# Patient Record
Sex: FEMALE
[De-identification: names, ages, dates, MRNs, and addresses within clinical notes are randomized per-mention and may not be internally consistent; named-entity substitution may affect disease eponyms.]

---

## 2022-08-23 PROBLEM — Z00.00 ENCOUNTER FOR PREVENTIVE HEALTH EXAMINATION: Status: ACTIVE | Noted: 2022-08-23

## 2022-09-01 ENCOUNTER — RESULT REVIEW (OUTPATIENT)
Age: 57
End: 2022-09-01

## 2022-09-12 ENCOUNTER — LABORATORY RESULT (OUTPATIENT)
Age: 57
End: 2022-09-12

## 2022-09-12 ENCOUNTER — APPOINTMENT (OUTPATIENT)
Dept: RADIOLOGY | Facility: CLINIC | Age: 57
End: 2022-09-12

## 2022-09-12 ENCOUNTER — OUTPATIENT (OUTPATIENT)
Dept: OUTPATIENT SERVICES | Facility: HOSPITAL | Age: 57
LOS: 1 days | End: 2022-09-12

## 2022-09-12 ENCOUNTER — TRANSCRIPTION ENCOUNTER (OUTPATIENT)
Age: 57
End: 2022-09-12

## 2022-09-12 ENCOUNTER — APPOINTMENT (OUTPATIENT)
Dept: ORTHOPEDIC SURGERY | Facility: CLINIC | Age: 57
End: 2022-09-12

## 2022-09-12 ENCOUNTER — APPOINTMENT (OUTPATIENT)
Dept: CT IMAGING | Facility: CLINIC | Age: 57
End: 2022-09-12

## 2022-09-12 ENCOUNTER — NON-APPOINTMENT (OUTPATIENT)
Age: 57
End: 2022-09-12

## 2022-09-12 VITALS
WEIGHT: 170 LBS | SYSTOLIC BLOOD PRESSURE: 111 MMHG | BODY MASS INDEX: 26.68 KG/M2 | RESPIRATION RATE: 16 BRPM | DIASTOLIC BLOOD PRESSURE: 75 MMHG | TEMPERATURE: 98.1 F | HEART RATE: 66 BPM | OXYGEN SATURATION: 96 % | HEIGHT: 67 IN

## 2022-09-12 DIAGNOSIS — Z87.39 PERSONAL HISTORY OF OTHER DISEASES OF THE MUSCULOSKELETAL SYSTEM AND CONNECTIVE TISSUE: ICD-10-CM

## 2022-09-12 DIAGNOSIS — Z01.812 ENCOUNTER FOR PREPROCEDURAL LABORATORY EXAMINATION: ICD-10-CM

## 2022-09-12 DIAGNOSIS — Z78.9 OTHER SPECIFIED HEALTH STATUS: ICD-10-CM

## 2022-09-12 DIAGNOSIS — M25.561 PAIN IN RIGHT KNEE: ICD-10-CM

## 2022-09-12 DIAGNOSIS — Z82.49 FAMILY HISTORY OF ISCHEMIC HEART DISEASE AND OTHER DISEASES OF THE CIRCULATORY SYSTEM: ICD-10-CM

## 2022-09-12 DIAGNOSIS — M17.11 UNILATERAL PRIMARY OSTEOARTHRITIS, RIGHT KNEE: ICD-10-CM

## 2022-09-12 DIAGNOSIS — Z20.822 ENCOUNTER FOR PREPROCEDURAL LABORATORY EXAMINATION: ICD-10-CM

## 2022-09-12 DIAGNOSIS — Z80.8 FAMILY HISTORY OF MALIGNANT NEOPLASM OF OTHER ORGANS OR SYSTEMS: ICD-10-CM

## 2022-09-12 PROCEDURE — 73562 X-RAY EXAM OF KNEE 3: CPT | Mod: 26,50,59

## 2022-09-12 PROCEDURE — 73700 CT LOWER EXTREMITY W/O DYE: CPT | Mod: 26,RT

## 2022-09-12 PROCEDURE — 77073 BONE LENGTH STUDIES: CPT | Mod: 26

## 2022-09-12 PROCEDURE — 99205 OFFICE O/P NEW HI 60 MIN: CPT

## 2022-09-12 NOTE — PHYSICAL EXAM
[de-identified] : Right Knee Exam: \par \par Skin: Normal. No erythema, no ecchymosis, no abrasions, no scratches, no tattoos.  \par                 \par Old Incisions: Healed arthroscopy portals and ACL surgey scars. \par \par Knee Joint Swelling: Positive effusion.  Moderate\par \par Popliteal Swelling: None	\par \par Pre-Patella Bursa Swelling: None. \par \par Alignment: 		        \par Valgus, Mild \par Passively correctable to near neutral.  \par \par Knee Joint Line Tenderness: \par Tender at lateral joint line. \par Not tender at the medial joint line.   \par Not tender in the patellofemoral compartment. \par \par Soft Tissue Tenderness: None. \par \par Medial Collateral Ligament Laxity:  Good endpoint.                                                       \par Normal laxity. Good endpoint. \par \par Lateral Collateral Ligament Laxity: Good endpoint       \par Lateral opening to varus stress.   Moderate\par \par ROM Extension: 0 degrees.   \par ROM Flexion: 135 degrees.   \par \par ACL Testing: 			\par Stable ACL. Good Endpoint.                                                                \par Lachman Test: Negative \par Anterior Drawer Test: Negative \par \par PCL Testing: 			\par Stable PCL.  Good Endpoint.                                                               \par Posterior Drawer Test: Negative \par \par Patella Compression Test: Negative \par \par Patellofemoral Crepitus: None.   \par \par Patellofemoral Apprehension Testing: Negative. \par \par Patellofemoral Laxity: Normal.\par \par Motor Strength: 			\par Quadriceps strength is 5 out of 5                                                                \par Hamstring strength is 5 out of 5                                                               \par Ankle dorsiflexion strength is 5 out of 5                                                              \par Ankle plantarflexion strength is 5 out of 5 \par \par Sensation: Light tough sensation in the lower extremity is abnormal. She has decreased sensation in the tibial tubercle region along with medial leg and lateral leg sensory changes.\par                                    \par Pulses: 				\par Pulses are palpable at the ankle at the Dorsalis Pedis Artery.                                                               \par Pulses are palpable at the Posterior Tibialis Artery.                                                               \par \par Osteophytes: There are no palpable osteophytes along the knee margins.                                                                \par \par Hip Motion: The patient has full and painless hip range of motion.                                                        \par \par Hip Tenderness: The patient has no Greater Trochanteric hip bursa tenderness.                                                                 \par \par Lumbar Spine Symptoms: Negative straight leg raise.  \par \par Leg Lengths: Symmetric.                                                                 \par \par Gait: Limping Gait.  Abnormal gait.  \par \par Assistive Devices: 	None\par  [de-identified] : X-ray of the Right Knee:\par \par AP Standing View:\par Lateral joint space loss. Moderate.\par Medial joint space preserved.\par \par PA Flexion View:\par Lateral joint space loss. Severe.\par Medial joint space preserved.\par \par \par Lateral View: \par Patellofemoral joint space preserved.\par \par [place] View: \par Patellofemoral joint space is preserved.\par Patellofemoral joint central tracking.\par \par \par Bilateral Hip to Ankle Standing View:\par Alignment: Valgus\par Lateral joint space loss.Mild.\par Hips: No significant changes\par \par \par \par

## 2022-09-12 NOTE — REVIEW OF SYSTEMS
[Negative] : Heme/Lymph [Arthralgia] : arthralgia [Joint Pain] : joint pain [Joint Stiffness] : joint stiffness [Joint Swelling] : joint swelling [[id number]] : Arthritis

## 2022-09-12 NOTE — HISTORY OF PRESENT ILLNESS
[de-identified] :  Dania is a  57 year old female  who presents today for a Right knee pain.\par \par Afia complains of pain in her right knee increasing over the last 8 months.  She has pain with activities including skiing.  She has swelling in the right knee joint.  Her location of her pain is on the outer side in the lateral compartment sometimes, just above the kneecap as well, she has some pain in the calf and outer side of the lower leg as well.  Her pain is intermittent.  Her pain level is a 5 out of 10 and she rates it as moderate the character the pain is a dull aching pain that is getting worse and her painful activities include squatting lunging and going down stairs as well as hiking down hills and skiing.  She had a knee arthroscopy in the past and after that knee arthroscopy she had sensory changes around the area of the tibial tubercle was dull down the medial side of the calf is definitely more dull and sometimes hypersensitive and sensitivity on the lateral side of the leg is also decreased.\par \par She is here today for a preoperative evaluation for a right lateral compartment partial knee replacement with Tim robotic arm assistance.  We have already had a previous communication over the phone and going through a lot of her history and her story.\par \par \par

## 2022-09-14 RX ORDER — TRAMADOL HYDROCHLORIDE 50 MG/1
50 TABLET, COATED ORAL
Qty: 32 | Refills: 0 | Status: ACTIVE | COMMUNITY
Start: 2022-09-14 | End: 1900-01-01

## 2022-09-14 RX ORDER — CEPHALEXIN 500 MG/1
500 CAPSULE ORAL 4 TIMES DAILY
Qty: 20 | Refills: 0 | Status: ACTIVE | COMMUNITY
Start: 2022-09-14 | End: 1900-01-01

## 2022-09-14 RX ORDER — CETIRIZINE HCL 10 MG
10 TABLET ORAL
Refills: 0 | Status: ACTIVE | COMMUNITY

## 2022-09-14 RX ORDER — ALPRAZOLAM 2 MG/1
TABLET ORAL
Refills: 0 | Status: ACTIVE | COMMUNITY

## 2022-09-15 ENCOUNTER — APPOINTMENT (OUTPATIENT)
Age: 57
End: 2022-09-15

## 2022-09-15 PROCEDURE — 27446 REVISION OF KNEE JOINT: CPT | Mod: RT

## 2022-09-16 ENCOUNTER — APPOINTMENT (OUTPATIENT)
Dept: ORTHOPEDIC SURGERY | Facility: CLINIC | Age: 57
End: 2022-09-16

## 2022-09-16 VITALS
TEMPERATURE: 98.7 F | DIASTOLIC BLOOD PRESSURE: 75 MMHG | SYSTOLIC BLOOD PRESSURE: 119 MMHG | HEIGHT: 67 IN | HEART RATE: 62 BPM | OXYGEN SATURATION: 96 % | BODY MASS INDEX: 26.68 KG/M2 | RESPIRATION RATE: 16 BRPM | WEIGHT: 170 LBS

## 2022-09-16 PROCEDURE — 73562 X-RAY EXAM OF KNEE 3: CPT | Mod: 26,RT,59

## 2022-09-16 PROCEDURE — 77073 BONE LENGTH STUDIES: CPT | Mod: 26

## 2022-09-16 PROCEDURE — 99024 POSTOP FOLLOW-UP VISIT: CPT

## 2022-10-01 NOTE — DISCUSSION/SUMMARY
Problem: Discharge Planning  Goal: Discharge to home or other facility with appropriate resources  Outcome: Progressing     Problem: Safety - Adult  Goal: Free from fall injury  Outcome: Progressing     Problem: Chronic Conditions and Co-morbidities  Goal: Patient's chronic conditions and co-morbidity symptoms are monitored and maintained or improved  Outcome: Progressing     Problem: Pain  Goal: Verbalizes/displays adequate comfort level or baseline comfort level  Outcome: Progressing     Problem: Respiratory - Adult  Goal: Achieves optimal ventilation and oxygenation  Outcome: Progressing [de-identified] : Assessment:\par \par -Right Knee Lateral compartment OA\par -Right lateral Knee Pain\par -Right Knee xray with severe lateral joint space loss on flexion WB view.\par \par \par Plan: \par \par 1. The patient would like to proceed with a Partial Knee Replacement \par 2. The procedure will be Tim Robotic Arm Assisted with cemented, [employer] MCK implants \par 2. The side, Right \par 4. Compartment, Lateral \par 2. The patient will discuss surgery scheduling with our coordinator\par 3. We will schedule a pre-op office visit just prior to surgery to review all information\par 4. Schedule Tim™ Robotic-Arm System with the OR \par 5. Schedule CT scan with Tim™ Protocol with Radiology\par 6. Schedule Medical Clearance & Pre-op studies\par 7. Schedule PRP in the Operating Room with vendor \par 8. I have had an Informed Consent Discussion with the patient & documentation was provided\par \par \par \par Discussion Partial Knee Replacement:\par \par I had a discussion with the patient regarding the findings of their history, exam and imaging. We discussed the option of Partial Knee Replacement using the precision Tim™ Robotic-Arm System. We discussed the indications, surgical process, my techniques of surgery, the probable post-operative course and instructions, and the risks and benefits of the procedure. The patient had their questions answered to their satisfaction. Although there are no guarantees to success, I feel that the surgery would be very beneficial and there is an excellent chance for a positive outcome.\par \par The risks of this procedure include but are not limited to the risks of anesthesia, heart attack, stroke, respiratory complications, wound healing problems, skin blisters, delayed wound healing, infection, bleeding, nerve damage, vessel damage, skin sensory changes next to the incisions, loss of motion, scar tissue formation requiring further treatment, blood clots, heterotopic bone formation, implant wear, implant loosening after surgery, allergic responses to the implant materials, continued pain despite proper implant placement, soft tissue pain, continued knee swelling, with partial knee replacement there can be progression of arthritis into the other compartments requiring future surgery, and the possible need for further surgery in the future for scar tissue removal, plastic insert change over time, addition of other compartment replacements, and possible revision to total knee replacement.\par \par I look forward to the opportunity to help this patient with their painful knee condition.\par

## 2023-01-17 ENCOUNTER — APPOINTMENT (OUTPATIENT)
Dept: RADIOLOGY | Facility: CLINIC | Age: 58
End: 2023-01-17

## 2023-01-17 ENCOUNTER — APPOINTMENT (OUTPATIENT)
Dept: ORTHOPEDIC SURGERY | Facility: CLINIC | Age: 58
End: 2023-01-17
Payer: COMMERCIAL

## 2023-01-17 ENCOUNTER — OUTPATIENT (OUTPATIENT)
Dept: OUTPATIENT SERVICES | Facility: HOSPITAL | Age: 58
LOS: 1 days | End: 2023-01-17
Payer: COMMERCIAL

## 2023-01-17 ENCOUNTER — RESULT REVIEW (OUTPATIENT)
Age: 58
End: 2023-01-17

## 2023-01-17 ENCOUNTER — NON-APPOINTMENT (OUTPATIENT)
Age: 58
End: 2023-01-17

## 2023-01-17 VITALS
HEIGHT: 68 IN | BODY MASS INDEX: 25.01 KG/M2 | TEMPERATURE: 98.6 F | SYSTOLIC BLOOD PRESSURE: 128 MMHG | WEIGHT: 165 LBS | OXYGEN SATURATION: 99 % | RESPIRATION RATE: 18 BRPM | HEART RATE: 61 BPM | DIASTOLIC BLOOD PRESSURE: 76 MMHG

## 2023-01-17 DIAGNOSIS — Z47.1 AFTERCARE FOLLOWING JOINT REPLACEMENT SURGERY: ICD-10-CM

## 2023-01-17 DIAGNOSIS — Z96.651 AFTERCARE FOLLOWING JOINT REPLACEMENT SURGERY: ICD-10-CM

## 2023-01-17 DIAGNOSIS — Z96.651 PRESENCE OF RIGHT ARTIFICIAL KNEE JOINT: ICD-10-CM

## 2023-01-17 PROCEDURE — 77073 BONE LENGTH STUDIES: CPT | Mod: 26

## 2023-01-17 PROCEDURE — 73562 X-RAY EXAM OF KNEE 3: CPT | Mod: 26,RT

## 2023-01-17 PROCEDURE — 99213 OFFICE O/P EST LOW 20 MIN: CPT

## 2023-01-17 NOTE — PHYSICAL EXAM
[de-identified] : Right Knee Exam: \par \par Skin: Normal. No erythema, no ecchymosis, no abrasions, no scratches, no tattoos.  \par                 \par Old Incisions: Healed arthroscopy portals and ACL surgey scars. Lateral incision is well healed as are the pin sites.\par \par Knee Joint Swelling: none\par \par Popliteal Swelling: None	\par \par Pre-Patella Bursa Swelling: None. \par \par Alignment: Neutral\par \par Knee Joint Line Tenderness: \par Not tender at lateral joint line. \par Not tender at the medial joint line.   \par Not tender in the patellofemoral compartment. \par \par Soft Tissue Tenderness: None. \par \par Medial Collateral Ligament Laxity:  Good endpoint.                                                       \par Normal laxity. Good endpoint. \par \par Lateral Collateral Ligament Laxity: Good endpoint       \par Lateral opening to varus stress.   Moderate\par \par ROM Extension: 0 degrees.   \par ROM Flexion: 140 degrees.   \par \par ACL Testing: 			\par Stable ACL. Good Endpoint.                                                                \par Lachman Test: Negative \par Anterior Drawer Test: Negative \par \par PCL Testing: 			\par Stable PCL.  Good Endpoint.                                                               \par Posterior Drawer Test: Negative \par \par Patella Compression Test: Negative \par \par Patellofemoral Crepitus: None.   \par \par Patellofemoral Apprehension Testing: Negative. \par \par Patellofemoral Laxity: Normal.\par \par Motor Strength: 			\par Quadriceps strength is 5 out of 5                                                                \par Hamstring strength is 5 out of 5                                                               \par Ankle dorsiflexion strength is 5 out of 5                                                              \par Ankle plantarflexion strength is 5 out of 5 \par \par Sensation: Light tough sensation in the lower extremity is normal except to the lateral side of the incision.\par                                    \par Pulses: 				\par Pulses are palpable at the ankle at the Dorsalis Pedis Artery.                                                               \par Pulses are palpable at the Posterior Tibialis Artery.                                                                                                                             \par \par Hip Motion: The patient has full and painless hip range of motion.                                                        \par \par Hip Tenderness: The patient has no Greater Trochanteric hip bursa tenderness.                                                                 \par \par Lumbar Spine Symptoms: Negative straight leg raise.  \par \par Leg Lengths: Symmetric.                                                                 \par \par Gait: Limping Gait.  normal \par \par Assistive Devices: 	None\par  [de-identified] : Post Op  X-Rays Partial Knee:\par \par Examination of the Knee: Right\par \par Views: AP, Lateral, Merchant, Hip to Ankle \par \par AP Standing View:\par Lateral Partial Knee Replacement in good position.\par No signs of Loosening.\par No subsidence.\par No fracture.\par Good insert space thickness.\par \par Lateral View: \par Lateral Partial  Knee Replacement in good position.\par Fix is anatomic on both femur and tibia.\par \par [place] View: \par Patellofemoral joint shows central tracking.\par Lateral implant tracking is central on the tibial implant.\par \par Bilateral Hip to Ankle Standing View:\par Knee Alignment is in neutral 179 degrees.

## 2023-01-17 NOTE — HISTORY OF PRESENT ILLNESS
[___ Days Post Op] : post op day #[unfilled] [de-identified] : 57 year old female here for her right lateral partial knee replacement followup on post op day 1. She is in no pain. She is carrying her walker. She is very happy with the stability she feels.  [de-identified] : Right Knee Exam: \par \par Incision: Healing lateral parapatellar incision. \par \par Incision Pin Sites: Clean & dry.\par \par Skin:  Healthy appearing. No erythema.  No ecchymosis.  No drainage.  Dermabond intact. \par                 \par Knee Joint Swelling: . No effusion. \par \par Alignment: Neutral.                                                       \par ROM Extension: 0 degrees.   \par ROM Flexion: 100 degrees.   \par \par Medial Collateral Ligament Laxity:  Normal laxity. Good endpoint. \par \par Lateral Collateral Ligament Laxity: Normal laxity. Good endpoint. \par \par Instability: No flexion or extension instability\par \par Anterior Drawer Test: No significant translation. Good endpoint. \par Posterior Drawer Test:  Stable with good endpoint \par \par Motor Strength: 	\par Quadriceps strength is 5 out of 5 \par Hamstring strength is 5 out of 5 \par Ankle dorsiflexion strength is 5 out of 5 \par Ankle plantarflexion strength is 5 out of 5 \par \par Sensation:  \par Light tough sensation Sensory change is located lateral to incision as expected. \par \par Pulses: \par Pulses are palpable at the ankle at the Dorsalis Pedis Artery.  \par Pulses are palpable at the Posterior Tibialis Artery. \par                                                                 \par Gait: Limping Gait expected after surgery.  \par \par Assistive Devices: walker but was carrying it.\par \par  [de-identified] : Post Op Day One X-Rays:\par \par Examination of the Knee: Right \par \par Views: AP, Lateral, Merchant, Hip to Ankle \par \par \par AP Standing View:\par Lateral Partial Knee Replacement in good position.\par No signs of Loosening.\par No subsidence.\par No fracture.\par Preserved medial compartment.\par \par Lateral View: \par Partial Knee Replacement in good position.\par Fix is anatomic on both femur and tibia.\par \par [place] View: \par Patellofemoral joint central tracking.\par \par Bilateral Hip to Ankle Standing View:\par Knee Alignment is in neutral position. \par Overall alignment is approximately 2 degrees of varus on the right and 1 degree of valgus on the left.\par \par  [de-identified] : Assessment:\par \par - Post Op 1 day after Partial Knee Replacement, Lateral compartment, Tim Robotic-Assisted.\par - Gait is good walking carrying walker.\par - Incisions clean and dry with no erythema and dermabond intact.\par - No signs of infection.\par - No calf tenderness. No signs of DVT.\par - No pain.\par - Range of Motion is good with 0-100.\par - Can walk without support.\par - Stable collateral ligament exam.\par - New X-rays today. Cemented implants in good position.\par - Patient is very satisfied.\par \par  [de-identified] : Plan:\par \par 1. Continue Post op protocol\par 2. Full Weight bearing.\par 3. Walker or crutches for safety until progressed by PT\par 4. Continue with ASA orally BID for 4 weeks for DVT prophylaxsis.\par 5. Knee Sleeve provided and applied to patient's leg to reduce swelling to be worn for 4-6 weeks. May remove for showers and bedtime.\par 6. Finish oral antibiotics prophylaxsis.\par 7. Tylenol and Ibuprofen for pain.\par 8. Tramadol provided for moderate to severe pain.\par 9. Use Cold Therapy as directed when not ambulating.\par 10. Begin home exercise program and PT.\par 11. Start Outpatient PT when able to get out of home preferably in 1 to 2 weeks.\par 12. Followup in 3 weeks on the phone.\par 13. Returning home by plane today and I will call her tonight to see how her trip was.

## 2023-01-17 NOTE — HISTORY OF PRESENT ILLNESS
[de-identified] : Afia is here today for a 4-month follow-up for her right lateral partial knee replacement that was done on September 15, 2022.  She is 57 years old and is a  who was having lateral sided knee pain because of a osteoarthritis of the lateral compartment.  She underwent a Tim robotic assisted lateral partial knee replacement at [facility] 4 months ago and has been doing really well.  She says she has no pain in the knee and the lateral compartment where it was prior.  She can walk as far as she wants.  She has been skiing at least 4 times and has no pain in her knee when she skis.  Her flexibility is excellent.  She can go up and down stairs 1 step at a time without difficulty.  And she is currently unlimited in her activities.

## 2023-01-17 NOTE — DISCUSSION/SUMMARY
[de-identified] : Assessment:\par \par -Right Knee Lateral compartment partial knee.\par -no pain in latearal side.\par -back to skiing 4 times\par \par \par \par Plan: \par \par 1. May continue with skiing as tolerated, no restrictions.\par 2. Keep working on strengthening\par 3. Okay to participate again with  and letter provided.\par 4. See her back one year post op. with xrays.

## 2025-06-25 ENCOUNTER — APPOINTMENT (OUTPATIENT)
Dept: ORTHOPEDIC SURGERY | Facility: CLINIC | Age: 60
End: 2025-06-25
Payer: COMMERCIAL

## 2025-06-25 ENCOUNTER — OUTPATIENT (OUTPATIENT)
Dept: OUTPATIENT SERVICES | Facility: HOSPITAL | Age: 60
LOS: 1 days | End: 2025-06-25

## 2025-06-25 ENCOUNTER — APPOINTMENT (OUTPATIENT)
Dept: RADIOLOGY | Facility: CLINIC | Age: 60
End: 2025-06-25
Payer: COMMERCIAL

## 2025-06-25 VITALS
BODY MASS INDEX: 25.01 KG/M2 | DIASTOLIC BLOOD PRESSURE: 74 MMHG | OXYGEN SATURATION: 98 % | WEIGHT: 165 LBS | SYSTOLIC BLOOD PRESSURE: 122 MMHG | HEART RATE: 55 BPM | HEIGHT: 68 IN

## 2025-06-25 PROCEDURE — 99213 OFFICE O/P EST LOW 20 MIN: CPT

## 2025-06-25 PROCEDURE — 73564 X-RAY EXAM KNEE 4 OR MORE: CPT | Mod: 26,50,59

## 2025-06-25 PROCEDURE — 77073 BONE LENGTH STUDIES: CPT | Mod: 26
